# Patient Record
Sex: FEMALE | Race: WHITE | NOT HISPANIC OR LATINO
[De-identification: names, ages, dates, MRNs, and addresses within clinical notes are randomized per-mention and may not be internally consistent; named-entity substitution may affect disease eponyms.]

---

## 2023-09-26 ENCOUNTER — APPOINTMENT (OUTPATIENT)
Dept: ORTHOPEDIC SURGERY | Facility: CLINIC | Age: 68
End: 2023-09-26
Payer: MEDICARE

## 2023-09-26 VITALS — BODY MASS INDEX: 32.18 KG/M2 | WEIGHT: 205 LBS | HEIGHT: 67 IN

## 2023-09-26 DIAGNOSIS — J45.909 UNSPECIFIED ASTHMA, UNCOMPLICATED: ICD-10-CM

## 2023-09-26 DIAGNOSIS — Z78.9 OTHER SPECIFIED HEALTH STATUS: ICD-10-CM

## 2023-09-26 PROBLEM — Z00.00 ENCOUNTER FOR PREVENTIVE HEALTH EXAMINATION: Status: ACTIVE | Noted: 2023-09-26

## 2023-09-26 PROCEDURE — 99204 OFFICE O/P NEW MOD 45 MIN: CPT

## 2023-09-26 RX ORDER — MELOXICAM 7.5 MG/1
7.5 TABLET ORAL
Qty: 30 | Refills: 0 | Status: ACTIVE | COMMUNITY
Start: 2023-09-26 | End: 1900-01-01

## 2023-10-09 ENCOUNTER — APPOINTMENT (OUTPATIENT)
Dept: ORTHOPEDIC SURGERY | Facility: CLINIC | Age: 68
End: 2023-10-09
Payer: MEDICARE

## 2023-10-09 PROCEDURE — 73030 X-RAY EXAM OF SHOULDER: CPT | Mod: RT

## 2023-10-09 PROCEDURE — 99213 OFFICE O/P EST LOW 20 MIN: CPT

## 2023-10-31 ENCOUNTER — APPOINTMENT (OUTPATIENT)
Dept: ORTHOPEDIC SURGERY | Facility: CLINIC | Age: 68
End: 2023-10-31
Payer: MEDICARE

## 2023-10-31 PROCEDURE — 99213 OFFICE O/P EST LOW 20 MIN: CPT

## 2023-10-31 PROCEDURE — 73030 X-RAY EXAM OF SHOULDER: CPT | Mod: RT

## 2024-01-15 ENCOUNTER — APPOINTMENT (OUTPATIENT)
Dept: ORTHOPEDIC SURGERY | Facility: CLINIC | Age: 69
End: 2024-01-15
Payer: MEDICARE

## 2024-01-15 DIAGNOSIS — S49.91XA UNSPECIFIED INJURY OF RIGHT SHOULDER AND UPPER ARM, INITIAL ENCOUNTER: ICD-10-CM

## 2024-01-15 PROCEDURE — 99213 OFFICE O/P EST LOW 20 MIN: CPT

## 2024-01-15 PROCEDURE — 73030 X-RAY EXAM OF SHOULDER: CPT | Mod: RT

## 2024-01-15 NOTE — ASSESSMENT
[FreeTextEntry1] : Right proximal humerus fracture - will continue to manage with closed management  Reviewed radiographs with patient. Discussed radiograph alignment is within acceptable parameters for closed management. Discussed risk of pain, stiffness, AVN, arthrosis and displacement requiring further intervention. Discussed displacement since last visit with patient and her daughter. Discussed nonoperative vs operative management. If pain and dysfunction persist, can consider arthroplasty.  PT for ROM/strengthening, WBAT  F/u 14weeks/prn; repeat shoulder films.

## 2024-01-15 NOTE — IMAGING
[de-identified] : Right shoulder with resolved ecchymosis, FF to 100 +ttp. Able to make fist, oppose thumb to small finger and abduct fingers. Sensation intact throughout. <2sec cap refill.  Right shoulder radiographs with proximal humerus fractur with surgical neck and greater tuberosity fracture lines. Alignment maintained. Healed.

## 2024-01-15 NOTE — DATA REVIEWED
[FreeTextEntry1] :  Right proximal humerus fracture - will continue to manage with closed management  Reviewed radiographs with patient. Discussed radiograph alignment is within acceptable parameters for closed management. NWB, Elevate. Discussed risk of pain, stiffness, AVN, arthrosis and displacement requiring further intervention. Discussed displacement since last visit with patient and her daughter. Discussed nonoperative vs operative management., Will begin PT in 3 weeks. If pain and dysfunction persist, can consider arthroplasty.  F/u 3 weeks; repeat shoulder films.

## 2024-01-15 NOTE — HISTORY OF PRESENT ILLNESS
[de-identified] : 67 y/o Female RHD, Presents in office with right shoulder pain from 9/19/23. Patient tripped over her grandson toy and landed on her right side. Patient got MRI and x-rays done at [facility]. Patient reports sharp throbbing pain. Patient takes Advil to help with the pain. No numbness/tingling.   10/09/2023: f/u patient is her today for her right shoulder fx. Admits to taking Tylenol during the day and Meloxicam at night with no relief. Patients pain is lateral. Patient states she is in extreme pain. Reports that she wears her sling every day and when she sleeps. States her pain was serve on 10/07/2023.  Increased pain in recent days after trying to open something and feeling a shift of bone.  10/31/23: right shoulder f/u, reports pain has subsided slightly from last visit. Meloxicam helps reduce pain. Denies numbness/ tingling.  01/15/2024: f/u for the right shoulder fx. States she feels better than last visit, has more ROM. Denies going to PT. Admits to taking Tylenol prn.